# Patient Record
(demographics unavailable — no encounter records)

---

## 2024-12-03 NOTE — DISCUSSION/SUMMARY
[FreeTextEntry1] : 1.  dyspnea -  most likely deconditioning.  Cath with lum irreg.  Will get an echo to evaluate.   [EKG obtained to assist in diagnosis and management of assessed problem(s)] : EKG obtained to assist in diagnosis and management of assessed problem(s)

## 2024-12-03 NOTE — HISTORY OF PRESENT ILLNESS
[FreeTextEntry1] : 61 year old with a history of diabetes, htn, hyperlipidemia who had a cath in september with luminal irreg.  He can walk for 30 minutes with no chest pain but occasional dyspnea.  Has been following a diet.  Low salt diet.

## 2025-03-04 NOTE — ASSESSMENT
[FreeTextEntry1] : will observe.  no indication for any biopsies, radiographs or antibiotics. to return earlier if any change.  sonogram next visit. patient has been given the opportunity to ask questions, and all of the patient's questions have been answered to their satisfaction

## 2025-03-04 NOTE — PHYSICAL EXAM
[de-identified] : no palpable thyroid nodules [Laryngoscopy Performed] : laryngoscopy was performed, see procedure section for findings [Midline] : located in midline position [Normal] : orientation to person, place, and time: normal [de-identified] : no palpable parotid masses

## 2025-03-04 NOTE — HISTORY OF PRESENT ILLNESS
[de-identified] : prior evaluation of intraparotid node. denies any symptoms related. no changes medically since last visit with exception of cardiac catheterization. now on Mounjaro. recent sonogram shows stable benign appearing node. I have reviewed all old and new data and available images.

## 2025-04-01 NOTE — PHYSICAL EXAM
[de-identified] : No mass appreciated discomfort with ROM [Midline] : located in midline position [Normal] : orientation to person, place, and time: normal

## 2025-04-01 NOTE — HISTORY OF PRESENT ILLNESS
[de-identified] : Pt c/o neck pain on the left mostly with movement denies pain with eating or and swelling recent sonogram reviewed

## 2025-04-01 NOTE — ASSESSMENT
[FreeTextEntry1] : Parotid mass Neck discomfort MRI r/o parotid mass If MRI negative then to be assessed by Ortho